# Patient Record
Sex: MALE | Race: WHITE
[De-identification: names, ages, dates, MRNs, and addresses within clinical notes are randomized per-mention and may not be internally consistent; named-entity substitution may affect disease eponyms.]

---

## 2019-02-24 ENCOUNTER — HOSPITAL ENCOUNTER (OUTPATIENT)
Dept: HOSPITAL 47 - EC | Age: 24
Setting detail: OBSERVATION
LOS: 1 days | Discharge: HOME | End: 2019-02-25
Payer: COMMERCIAL

## 2019-02-24 VITALS — RESPIRATION RATE: 18 BRPM

## 2019-02-24 DIAGNOSIS — R55: ICD-10-CM

## 2019-02-24 DIAGNOSIS — E66.9: ICD-10-CM

## 2019-02-24 DIAGNOSIS — R10.9: ICD-10-CM

## 2019-02-24 DIAGNOSIS — R11.2: ICD-10-CM

## 2019-02-24 DIAGNOSIS — R00.0: ICD-10-CM

## 2019-02-24 DIAGNOSIS — R07.89: Primary | ICD-10-CM

## 2019-02-24 DIAGNOSIS — D72.829: ICD-10-CM

## 2019-02-24 DIAGNOSIS — R42: ICD-10-CM

## 2019-02-24 DIAGNOSIS — Z84.89: ICD-10-CM

## 2019-02-24 DIAGNOSIS — R19.7: ICD-10-CM

## 2019-02-24 DIAGNOSIS — F12.90: ICD-10-CM

## 2019-02-24 DIAGNOSIS — J98.11: ICD-10-CM

## 2019-02-24 DIAGNOSIS — R61: ICD-10-CM

## 2019-02-24 LAB
ALBUMIN SERPL-MCNC: 4.3 G/DL (ref 3.5–5)
ALP SERPL-CCNC: 56 U/L (ref 38–126)
ALT SERPL-CCNC: 40 U/L (ref 21–72)
ANION GAP SERPL CALC-SCNC: 10 MMOL/L
APTT BLD: 23.5 SEC (ref 22–30)
AST SERPL-CCNC: 23 U/L (ref 17–59)
BASOPHILS # BLD AUTO: 0.1 K/UL (ref 0–0.2)
BASOPHILS NFR BLD AUTO: 0 %
BUN SERPL-SCNC: 15 MG/DL (ref 9–20)
CALCIUM SPEC-MCNC: 9.4 MG/DL (ref 8.4–10.2)
CHLORIDE SERPL-SCNC: 107 MMOL/L (ref 98–107)
CO2 SERPL-SCNC: 22 MMOL/L (ref 22–30)
D DIMER PPP FEU-MCNC: <0.17 MG/L FEU (ref ?–0.6)
EOSINOPHIL # BLD AUTO: 0.2 K/UL (ref 0–0.7)
EOSINOPHIL NFR BLD AUTO: 1 %
ERYTHROCYTE [DISTWIDTH] IN BLOOD BY AUTOMATED COUNT: 4.73 M/UL (ref 4.3–5.9)
ERYTHROCYTE [DISTWIDTH] IN BLOOD: 12.3 % (ref 11.5–15.5)
GLUCOSE SERPL-MCNC: 115 MG/DL (ref 74–99)
HCT VFR BLD AUTO: 42.6 % (ref 39–53)
HGB BLD-MCNC: 15 GM/DL (ref 13–17.5)
INR PPP: 1 (ref ?–1.2)
LYMPHOCYTES # SPEC AUTO: 1.7 K/UL (ref 1–4.8)
LYMPHOCYTES NFR SPEC AUTO: 9 %
MAGNESIUM SPEC-SCNC: 2 MG/DL (ref 1.6–2.3)
MCH RBC QN AUTO: 31.7 PG (ref 25–35)
MCHC RBC AUTO-ENTMCNC: 35.2 G/DL (ref 31–37)
MCV RBC AUTO: 90 FL (ref 80–100)
MONOCYTES # BLD AUTO: 0.8 K/UL (ref 0–1)
MONOCYTES NFR BLD AUTO: 4 %
NEUTROPHILS # BLD AUTO: 15.2 K/UL (ref 1.3–7.7)
NEUTROPHILS NFR BLD AUTO: 84 %
PH UR: 5.5 [PH] (ref 5–8)
PLATELET # BLD AUTO: 234 K/UL (ref 150–450)
POTASSIUM SERPL-SCNC: 4.2 MMOL/L (ref 3.5–5.1)
PROT SERPL-MCNC: 7 G/DL (ref 6.3–8.2)
PT BLD: 10.3 SEC (ref 9–12)
SODIUM SERPL-SCNC: 139 MMOL/L (ref 137–145)
SP GR UR: 1.01 (ref 1–1.03)
UROBILINOGEN UR QL STRIP: <2 MG/DL (ref ?–2)
WBC # BLD AUTO: 18 K/UL (ref 3.8–10.6)

## 2019-02-24 PROCEDURE — 80053 COMPREHEN METABOLIC PANEL: CPT

## 2019-02-24 PROCEDURE — 81003 URINALYSIS AUTO W/O SCOPE: CPT

## 2019-02-24 PROCEDURE — 83735 ASSAY OF MAGNESIUM: CPT

## 2019-02-24 PROCEDURE — 93017 CV STRESS TEST TRACING ONLY: CPT

## 2019-02-24 PROCEDURE — 99285 EMERGENCY DEPT VISIT HI MDM: CPT

## 2019-02-24 PROCEDURE — 36415 COLL VENOUS BLD VENIPUNCTURE: CPT

## 2019-02-24 PROCEDURE — 85610 PROTHROMBIN TIME: CPT

## 2019-02-24 PROCEDURE — 93306 TTE W/DOPPLER COMPLETE: CPT

## 2019-02-24 PROCEDURE — 85730 THROMBOPLASTIN TIME PARTIAL: CPT

## 2019-02-24 PROCEDURE — 85379 FIBRIN DEGRADATION QUANT: CPT

## 2019-02-24 PROCEDURE — 80306 DRUG TEST PRSMV INSTRMNT: CPT

## 2019-02-24 PROCEDURE — 71275 CT ANGIOGRAPHY CHEST: CPT

## 2019-02-24 PROCEDURE — 85025 COMPLETE CBC W/AUTO DIFF WBC: CPT

## 2019-02-24 PROCEDURE — 71046 X-RAY EXAM CHEST 2 VIEWS: CPT

## 2019-02-24 PROCEDURE — 80061 LIPID PANEL: CPT

## 2019-02-24 PROCEDURE — 84484 ASSAY OF TROPONIN QUANT: CPT

## 2019-02-24 PROCEDURE — 83880 ASSAY OF NATRIURETIC PEPTIDE: CPT

## 2019-02-24 PROCEDURE — 93005 ELECTROCARDIOGRAM TRACING: CPT

## 2019-02-24 NOTE — CT
CT CHEST FOR PULMONARY EMBOLISM.

 

EXAMINATION TYPE: CT chest angio for PE

 

DATE OF EXAM: 2/24/2019

 

INDICATION: Mid chest pain today.

 

CT DLP: 491.5 mGycm, Automated exposure control for dose reduction was used.

 

CONTRAST: Patient injected with 100 mL of Isovue 370.

 

COMPARISON: None

 

TECHNIQUE: CT of the chest is performed on a spiral scan at 2 mm thick sections.  Study is performed 
with intravenous contrast timed for evaluation for pulmonary embolism.  This will limit additional po
rtions of the evaluation.  3-D MIP images reconstructed by the technologist are reviewed on the compu
ter in the coronal and sagittal planes. 

 

FINDINGS:

No persistent filling defects are evident to suggest an acute pulmonary embolism.

 

No mediastinal or hilar adenopathy enlarged by CT criteria is evident.  The ascending aorta diameter 
at the level of the main pulmonary artery is 3.2 cm.  The main pulmonary artery diameter at the bifur
cation is 2.6 cm.

 

There is some streak opacities in the posterior lateral right lung likely atelectasis. This could be 
followed. Some mild infiltrate is within the lingula likely atelectasis.

 

Limited CT section through the upper abdomen are unremarkable.

 

IMPRESSIONS:

1. No acute pulmonary embolism.

2. Some streak opacities within the lingula in the posterior lateral right lung can be related atelec
tasis. This could be followed.

## 2019-02-24 NOTE — XR
EXAMINATION TYPE: XR chest 2V

 

DATE OF EXAM: 2/24/2019

 

COMPARISON: 2/24/2019 outside study

 

INDICATION: Chest pain

 

TECHNIQUE:  Frontal and lateral views of the chest are obtained.

 

FINDINGS:  

The heart size is normal.  

The pulmonary vasculature is normal.

There is some interval development of plate atelectasis at the left base..

 

IMPRESSION:  

1. Developing platelike atelectasis left base

## 2019-02-24 NOTE — ED
General Adult HPI





- General


Chief complaint: Chest Pain


Stated complaint: tachycardia


Time Seen by Provider: 02/24/19 20:13


Source: patient, EMS, RN notes reviewed, old records reviewed


Mode of arrival: EMS


Limitations: no limitations





- History of Present Illness


Initial comments: 


23-year-old male patient with no pertinent past medical history presents to ED 

via transfer from [facility].  Patient reports that he was watching a 

baseball game with his friends, reports that he had drank approximately 3 beers 

and had previously smoked some marijuanna.  Patient was previously on a couch 

and developed some left substernal chest pain/pressure.  Patient states that he 

became very lightheaded and almost syncopized.  Patient was caught by his 

friends, did not suffer any fall or trauma.  Patient states that this chest 

pain continued for about 20 minutes.  Patient was transferred to [facility].  Patient was transferred from [facility] on nitro and heparin 

drip.  Upon arrival to ED patient is patient is asymptomatic.  Patient denies 

all complaints.  Patient has any shortness of breath or chest pain.  Patient 

has a nausea vomiting diarrhea, abdominal pain.  Patient denies other 

complaints.





Systemic: Pt denies fatigue, myalgia, fever/chills, rash. Pt denies weakness, 

night sweats, weight loss. 


Neuro: Pt denies headache, visual disturbances, syncope or pre-syncope.


HEENT: Pt denies ocular discharge or irritation, otalgia, rhinorrhea, 

pharyngitis or notable lymphadenopathy. 


Cardiopulmonary: Pt denies chest pain, SOB, heart palpitations, dyspnea on 

exertion.  


Abdominal/GI: Pt denies abdominal pain, n/v/d. 


: Pt denies dysuria, burning w/ urination, frequency/urgency. Denies new 

onset urinary or bowel incontinence.  


MSK: Pt denies myalgia, loss of strength or function in extremities. 


Neuro: Pt denies new onset weakness, paresthesias. 








- Related Data


 Home Medications











 Medication  Instructions  Recorded  Confirmed


 


No Known Home Medications  02/24/19 02/24/19











 Allergies











Allergy/AdvReac Type Severity Reaction Status Date / Time


 


No Known Allergies Allergy   Verified 02/24/19 23:50














Review of Systems


ROS Statement: 


Those systems with pertinent positive or pertinent negative responses have been 

documented in the HPI.





ROS Other: All systems not noted in ROS Statement are negative.





Past Medical History


Past Medical History: No Reported History


History of Any Multi-Drug Resistant Organisms: None Reported


Past Surgical History: No Surgical Hx Reported


Past Psychological History: No Psychological Hx Reported


Smoking Status: Light tobacco smoker


Past Alcohol Use History: Occasional


Past Drug Use History: Marijuana





General Exam





- General Exam Comments


Initial Comments: 


Constitutional: NAD, AOX3, Pt has pleasant affect. 


HEENT: NC/AT, trachea midline, neck supple, no lymphadenopathy. Posterior 

pharynx non erythematous, without exudates. External ears appear normal, 

without discharge. Mucous membranes moist. Eyes PERRLA, EOM intact. There is no 

scleral icterus. No pallor noted. 


Cardiopulmonary: RRR, no murmurs, rubs or gallops, no JVD noted. Lungs CTAB in 

anterior and posterior fields. No peripheral edema. 


Abdominal exam: Abdomen soft and non-distended. Abdomen non-tender to palpation 

in all 4 quadrants. Bowel sounds active in LLQ. No hepatosplenomegaly. No 

ecchymosis


Neuro: CN II-XII grossly intact. No nuchal rigidity. 


MSK: No posterior calf tenderness bilaterally, homans sign negative 

bilaterally. Posterior tibialis and radial pulse +2 bilaterally. Sensation 

intact in upper and lower extremities. Full active ROM in upper and lower 

extremities, 5/5 stregnth. 





Limitations: no limitations





Course


 Vital Signs











  02/24/19 02/24/19 02/24/19





  20:05 22:32 23:31


 


Temperature 99.2 F  98.8 F


 


Pulse Rate 115 H 91 89


 


Respiratory 16 18 18





Rate   


 


Blood Pressure 135/83 127/74 123/70


 


O2 Sat by Pulse 100 98 99





Oximetry   














Medical Decision Making





- Medical Decision Making


23-year-old male patient with no pertinent past medical history presents to ED 

via transfer from [facility].  Patient reports that he was watching a 

baseball game with his friends, reports that he had drank approximately 3 beers 

and had previously smoked some marijuanna.  Patient was previously on a couch 

and developed some left substernal chest pain/pressure.  Patient states that he 

became very lightheaded and almost syncopized.  Patient was caught by his 

friends, did not suffer any fall or trauma.  Patient states that this chest 

pain continued for about 20 minutes.  Patient was transferred to [facility].  Patient was transferred from [facility] on nitro and heparin 

drip.  Upon arrival to ED patient is patient is asymptomatic.  Patient denies 

all complaints.  Patient has any shortness of breath or chest pain.  Patient 

has a nausea vomiting diarrhea, abdominal pain.  Patient denies other 

complaints. Pt vital signs are as a displayed mild tachycardia which resolved 

with fluid administration.  Laboratory investigations revealed a leukocytosis 

of 18.0.  Correlation studies were within normal limits.  D-dimer was negative.

  CMP was non-impressive.  Troponin was negative.  BNP was within normal 

limits.  UA was negative.  Chest x-ray displayed atelectasis.  CT pulmonary 

angiography revealed no PE, displayed atelectasis. EKG not concerning for acute 

ischemia. Patient to be admitted for serial troponins and further evaluation by 

cardiology.  Case discussed in depth with Dr. Garduno. 








- Lab Data


Result diagrams: 


 02/24/19 20:43





 02/24/19 20:43


 Lab Results











  02/24/19 02/24/19 02/24/19 Range/Units





  20:43 20:43 20:43 


 


WBC  18.0 H    (3.8-10.6)  k/uL


 


RBC  4.73    (4.30-5.90)  m/uL


 


Hgb  15.0    (13.0-17.5)  gm/dL


 


Hct  42.6    (39.0-53.0)  %


 


MCV  90.0    (80.0-100.0)  fL


 


MCH  31.7    (25.0-35.0)  pg


 


MCHC  35.2    (31.0-37.0)  g/dL


 


RDW  12.3    (11.5-15.5)  %


 


Plt Count  234    (150-450)  k/uL


 


Neutrophils %  84    %


 


Lymphocytes %  9    %


 


Monocytes %  4    %


 


Eosinophils %  1    %


 


Basophils %  0    %


 


Neutrophils #  15.2 H    (1.3-7.7)  k/uL


 


Lymphocytes #  1.7    (1.0-4.8)  k/uL


 


Monocytes #  0.8    (0-1.0)  k/uL


 


Eosinophils #  0.2    (0-0.7)  k/uL


 


Basophils #  0.1    (0-0.2)  k/uL


 


PT    10.3  (9.0-12.0)  sec


 


INR    1.0  (<1.2)  


 


APTT    23.5  (22.0-30.0)  sec


 


D-Dimer    <0.17  (<0.60)  mg/L FEU


 


Sodium   139   (137-145)  mmol/L


 


Potassium   4.2   (3.5-5.1)  mmol/L


 


Chloride   107   ()  mmol/L


 


Carbon Dioxide   22   (22-30)  mmol/L


 


Anion Gap   10   mmol/L


 


BUN   15   (9-20)  mg/dL


 


Creatinine   0.86   (0.66-1.25)  mg/dL


 


Est GFR (CKD-EPI)AfAm   >90   (>60 ml/min/1.73 sqM)  


 


Est GFR (CKD-EPI)NonAf   >90   (>60 ml/min/1.73 sqM)  


 


Glucose   115 H   (74-99)  mg/dL


 


Calcium   9.4   (8.4-10.2)  mg/dL


 


Magnesium   2.0   (1.6-2.3)  mg/dL


 


Total Bilirubin   0.5   (0.2-1.3)  mg/dL


 


AST   23   (17-59)  U/L


 


ALT   40   (21-72)  U/L


 


Alkaline Phosphatase   56   ()  U/L


 


Troponin I     (0.000-0.034)  ng/mL


 


NT-Pro-B Natriuret Pep     pg/mL


 


Total Protein   7.0   (6.3-8.2)  g/dL


 


Albumin   4.3   (3.5-5.0)  g/dL














  02/24/19 02/24/19 Range/Units





  20:43 20:43 


 


WBC    (3.8-10.6)  k/uL


 


RBC    (4.30-5.90)  m/uL


 


Hgb    (13.0-17.5)  gm/dL


 


Hct    (39.0-53.0)  %


 


MCV    (80.0-100.0)  fL


 


MCH    (25.0-35.0)  pg


 


MCHC    (31.0-37.0)  g/dL


 


RDW    (11.5-15.5)  %


 


Plt Count    (150-450)  k/uL


 


Neutrophils %    %


 


Lymphocytes %    %


 


Monocytes %    %


 


Eosinophils %    %


 


Basophils %    %


 


Neutrophils #    (1.3-7.7)  k/uL


 


Lymphocytes #    (1.0-4.8)  k/uL


 


Monocytes #    (0-1.0)  k/uL


 


Eosinophils #    (0-0.7)  k/uL


 


Basophils #    (0-0.2)  k/uL


 


PT    (9.0-12.0)  sec


 


INR    (<1.2)  


 


APTT    (22.0-30.0)  sec


 


D-Dimer    (<0.60)  mg/L FEU


 


Sodium    (137-145)  mmol/L


 


Potassium    (3.5-5.1)  mmol/L


 


Chloride    ()  mmol/L


 


Carbon Dioxide    (22-30)  mmol/L


 


Anion Gap    mmol/L


 


BUN    (9-20)  mg/dL


 


Creatinine    (0.66-1.25)  mg/dL


 


Est GFR (CKD-EPI)AfAm    (>60 ml/min/1.73 sqM)  


 


Est GFR (CKD-EPI)NonAf    (>60 ml/min/1.73 sqM)  


 


Glucose    (74-99)  mg/dL


 


Calcium    (8.4-10.2)  mg/dL


 


Magnesium    (1.6-2.3)  mg/dL


 


Total Bilirubin    (0.2-1.3)  mg/dL


 


AST    (17-59)  U/L


 


ALT    (21-72)  U/L


 


Alkaline Phosphatase    ()  U/L


 


Troponin I  <0.012   (0.000-0.034)  ng/mL


 


NT-Pro-B Natriuret Pep   17  pg/mL


 


Total Protein    (6.3-8.2)  g/dL


 


Albumin    (3.5-5.0)  g/dL














- EKG Data


-: EKG Interpreted by Me (and dr garduno)


EKG Comments: 


Ventricular rate 106, NY interval 148, , QT//425.  Sinus 

tachycardia.  No concern for acute ischemia.








Disposition


Clinical Impression: 


 Chest pain, Syncope





Disposition: ADMITTED AS IP TO THIS HOSP


Condition: Fair


Is patient prescribed a controlled substance at d/c from ED?: No

## 2019-02-25 VITALS — HEART RATE: 86 BPM | DIASTOLIC BLOOD PRESSURE: 84 MMHG | TEMPERATURE: 98 F | SYSTOLIC BLOOD PRESSURE: 151 MMHG

## 2019-02-25 LAB
ALBUMIN SERPL-MCNC: 4.3 G/DL (ref 3.5–5)
ALP SERPL-CCNC: 47 U/L (ref 38–126)
ALT SERPL-CCNC: 38 U/L (ref 21–72)
ANION GAP SERPL CALC-SCNC: 9 MMOL/L
AST SERPL-CCNC: 24 U/L (ref 17–59)
BASOPHILS # BLD AUTO: 0.1 K/UL (ref 0–0.2)
BASOPHILS NFR BLD AUTO: 1 %
BUN SERPL-SCNC: 11 MG/DL (ref 9–20)
CALCIUM SPEC-MCNC: 9.8 MG/DL (ref 8.4–10.2)
CHLORIDE SERPL-SCNC: 110 MMOL/L (ref 98–107)
CHOLEST SERPL-MCNC: 190 MG/DL (ref ?–200)
CO2 SERPL-SCNC: 23 MMOL/L (ref 22–30)
EOSINOPHIL # BLD AUTO: 0.2 K/UL (ref 0–0.7)
EOSINOPHIL NFR BLD AUTO: 2 %
ERYTHROCYTE [DISTWIDTH] IN BLOOD BY AUTOMATED COUNT: 4.49 M/UL (ref 4.3–5.9)
ERYTHROCYTE [DISTWIDTH] IN BLOOD: 12.4 % (ref 11.5–15.5)
GLUCOSE SERPL-MCNC: 104 MG/DL (ref 74–99)
HCT VFR BLD AUTO: 41.6 % (ref 39–53)
HDLC SERPL-MCNC: 38 MG/DL (ref 40–60)
HGB BLD-MCNC: 14 GM/DL (ref 13–17.5)
LDLC SERPL CALC-MCNC: 118 MG/DL (ref 0–99)
LYMPHOCYTES # SPEC AUTO: 3.1 K/UL (ref 1–4.8)
LYMPHOCYTES NFR SPEC AUTO: 23 %
MCH RBC QN AUTO: 31.1 PG (ref 25–35)
MCHC RBC AUTO-ENTMCNC: 33.6 G/DL (ref 31–37)
MCV RBC AUTO: 92.6 FL (ref 80–100)
MONOCYTES # BLD AUTO: 0.7 K/UL (ref 0–1)
MONOCYTES NFR BLD AUTO: 5 %
NEUTROPHILS # BLD AUTO: 9.1 K/UL (ref 1.3–7.7)
NEUTROPHILS NFR BLD AUTO: 68 %
PLATELET # BLD AUTO: 221 K/UL (ref 150–450)
POTASSIUM SERPL-SCNC: 4.5 MMOL/L (ref 3.5–5.1)
PROT SERPL-MCNC: 7.1 G/DL (ref 6.3–8.2)
SODIUM SERPL-SCNC: 142 MMOL/L (ref 137–145)
TRIGL SERPL-MCNC: 170 MG/DL (ref ?–150)
WBC # BLD AUTO: 13.4 K/UL (ref 3.8–10.6)

## 2019-02-25 NOTE — P.DS
Providers


Date of admission: 


02/24/19 22:24





Expected date of discharge: 02/25/19


Attending physician: 


Tammi Roberts





Consults: 





 





02/24/19 22:53


Consult Physician Urgent 


   Consulting Provider: Otis Davis


   Consult Reason/Comments: chest pain, syncope


   Do you want consulting provider notified?: Yes











Primary care physician: 


Naomi [facility] Course: 





discharge diagnosis





1.  Chest pain.  Troponins negative 3.  EKG completed showing sinus 

tachycardia.  CTA completed showing no acute pulmonary embolism.  Some streaky 

opacities within the lingula in the posterior-lateral right leg have be related 

atelectasis.  This can be followed.  Cardiology consult has been placed.  2-D 

echocardiogram completed showing an EF greater than 55%. stress test completed 

showing excellent exercise tolerance normal EKG response to exercise.  

Discussed with cardiology services.  Patient has been cleared for discharge 

from cardiology standpoint acute coronary event has been ruled out. patient has 

been cleared for discharge from cardiology standpoint





2.  Leukocytosis.  Initial white blood cell 18.0.  UA negative.  Patient denies 

any symptoms of acute infection.  Patient afebrile.  Repeat WBC 13.4.  chest x-

ray has been completed showing developing platelike atelectasis left base.  

Patient denies any symptoms at this time.  Patient to follow-up PCP preferred 

management





3.  Occasional marijuana use.








Hospital course





This is a 23-year-old male patient of Dr. levine.  Patient presented to the 

emergency room with complaints of chest pain and near syncopal episode.  

Patient reports he was watching TV with a group of friends.  Patient does 

report he was drinking beer and had smoked some marijuana.  Patient states that 

while he was sitting down he started to experiencing chest discomfort that last 

approximately 10 minutes that caused diaphoresis and radiation to arm and neck.

  Patient reports that he felt lightheaded but never actually lost 

consciousness.  Patient was transferred to [facility] there to [facility] for further evaluation.  Patient denies any significant medical 

history.  Patient denies any family history of cardiac events.  Patient states 

he does occasionally smoke marijuana but denies any regular nicotine use.  

Chest x-ray completed showing developing platelike atelectasis left base.  

Chest CTA completed showing no acute pulmonary embolism and some streaky 

opacities within the lingula in the posterior-lateral right lung can be related 

atelectasis this could be followed.  At this time patient denies any chest pain 

or shortness breath.  Patient denies any nausea vomiting or diarrhea.  Patient 

denies any urinary burning or frequency. 








2-D echocardiogram completed showing an EF greater than 55%. stress test 

completed showing excellent exercise tolerance normal EKG response to exercise.

  Discussed with cardiology services.  Patient has been cleared for discharge 

from cardiology standpoint acute coronary event has been ruled out





patient's white blood cell trend down to 13.4.  Patient follow-up PCP for 

further management.





I performed an examination of the patient and discussed their management with 

the Nurse Practitioner.  I have reviewed the Nurse Practitioner's notes and 

agree with the documented findings and plan of care


Patient Condition at Discharge: Stable





Plan - Discharge Summary


Discharge Rx Participant: No


New Discharge Prescriptions: 


Continue


   No Known Home Medications 


Discharge Medication List





No Known Home Medications  02/24/19 [History]








Follow up Appointment(s)/Referral(s): 


Naomi Camejo MD [Primary Care Provider] - 1-2 days


Activity/Diet/Wound Care/Special Instructions: 


Activity as tolerated





diet regular





Discharge Disposition: HOME SELF-CARE

## 2019-02-25 NOTE — ECHOF
Referral Reason:cp



MEASUREMENTS

--------

HEIGHT: 175.3 cm

WEIGHT: 102.1 kg

BP: 

RVIDd:   2.9 cm     (< 3.3)

IVSd:   1.0 cm     (0.6 - 1.1)

LVIDd:   4.7 cm     (3.9 - 5.3)

LVPWd:   1.4 cm     (0.6 - 1.1)

IVSs:   1.4 cm

LVIDs:   3.7 cm

LVPWs:   1.3 cm

LA Diam:   3.3 cm     (2.7 - 3.8)

Ao Diam:   3.2 cm     (2.0 - 3.7)

AV Cusp:   1.5 cm     (1.5 - 2.6)

LA Diam:   3.2 cm     (2.7 - 3.8)

MV EXCURSION:   24.295 mm     (> 18.000)

MV EF SLOPE:   155 mm/s     (70 - 150)

EPSS:   0.5 cm

MV E Alonso:   0.77 m/s

MV DecT:   283 ms

MV A Alonso:   0.76 m/s

MV E/A Ratio:   1.02 

RAP:   5.00 mmHg

RVSP:   15.95 mmHg







FINDINGS

--------

Sinus rhythm.

This was a technically good study.

LV size, wall thickness and systolic function are normal, with an EF greater than 55%.   The left aleida
tricular size is normal.

The right ventricle is normal in size.

The left atrial size is normal.

The right atrial size is normal.

The aortic valve is trileaflet, and appears structurally normal. No aortic stenosis or regurgitation.


There is trace mitral regurgitation.

Mild tricuspid regurgitation present.   There is no evidence of pulmonary hypertension.   The right v
entricular systolic pressure, as measured by Doppler, is 15.95mmHg.

There is no pulmonic regurgitation present.

The aortic root size is normal.

There is no pericardial effusion.



CONCLUSIONS

--------

1. LV size, wall thickness and systolic function are normal, with an EF greater than 55%.

2. The left ventricular size is normal.

3. The right ventricle is normal in size.

4. The left atrial size is normal.

5. The right atrial size is normal.

6. The aortic valve is trileaflet, and appears structurally normal. No aortic stenosis or regurgitati
on.

7. There is trace mitral regurgitation.

8. Mild tricuspid regurgitation present.

9. There is no evidence of pulmonary hypertension.

10. The right ventricular systolic pressure, as measured by Doppler, is 15.95mmHg.

11. There is no pulmonic regurgitation present.

12. The aortic root size is normal.

13. There is no pericardial effusion.





SONOGRAPHER: Vanesa Zaragoza RDCS

## 2019-02-25 NOTE — P.HPIM
History of Present Illness


H&P Date: 02/25/19





This is a 23-year-old male patient of Dr. levine.  Patient presented to the 

emergency room with complaints of chest pain and near syncopal episode.  

Patient reports he was watching TV with a group of friends.  Patient does 

report he was drinking beer and had smoked some marijuana.  Patient states that 

while he was sitting down he started to experiencing chest discomfort that last 

approximately 10 minutes that caused diaphoresis and radiation to arm and neck.

  Patient reports that he felt lightheaded but never actually lost 

consciousness.  Patient was transferred to [facility] there to [facility] for further evaluation.  Patient denies any significant medical 

history.  Patient denies any family history of cardiac events.  Patient states 

he does occasionally smoke marijuana but denies any regular nicotine use.  

Chest x-ray completed showing developing platelike atelectasis left base.  

Chest CTA completed showing no acute pulmonary embolism and some streaky 

opacities within the lingula in the posterior-lateral right lung can be related 

atelectasis this could be followed.  At this time patient denies any chest pain 

or shortness breath.  Patient denies any nausea vomiting or diarrhea.  Patient 

denies any urinary burning or frequency. 





Review of Systems





Please refer to HPI otherwise unremarkable





Past Medical History


Past Medical History: No Reported History


History of Any Multi-Drug Resistant Organisms: None Reported


Past Surgical History: No Surgical Hx Reported


Past Anesthesia/Blood Transfusion Reactions: No Reported Reaction


Smoking Status: Light tobacco smoker





- Past Family History


  ** Mother


Family Medical History: No Reported History





  ** Father


Family Medical History: No Reported History





  ** Brother(s)


Family Medical History: Syncope





  ** Sister(s)


Family Medical History: No Reported History





Medications and Allergies


 Home Medications











 Medication  Instructions  Recorded  Confirmed  Type


 


No Known Home Medications  02/24/19 02/24/19 History











 Allergies











Allergy/AdvReac Type Severity Reaction Status Date / Time


 


No Known Allergies Allergy   Verified 02/24/19 23:50














Physical Exam


Vitals: 


 Vital Signs











  Temp Pulse Pulse Resp BP BP BP


 


 02/25/19 08:46        132/82


 


 02/25/19 08:00     18   


 


 02/25/19 07:58  97.6 F   71  18   133/77 


 


 02/25/19 03:42  97.6 F   62  18   132/71 


 


 02/25/19 03:17     18   


 


 02/25/19 00:45  98.2 F   72  18   132/74 


 


 02/25/19 00:00     18   


 


 02/24/19 23:31  98.8 F  89   18  123/70  


 


 02/24/19 22:32   91   18  127/74  


 


 02/24/19 20:05  99.2 F  115 H   16  135/83  














  BP BP Pulse Ox


 


 02/25/19 08:46  137/84  148/78 


 


 02/25/19 08:00   


 


 02/25/19 07:58    97


 


 02/25/19 03:42    95


 


 02/25/19 03:17   


 


 02/25/19 00:45    95


 


 02/25/19 00:00   


 


 02/24/19 23:31    99


 


 02/24/19 22:32    98


 


 02/24/19 20:05    100








 Intake and Output











 02/24/19 02/25/19 02/25/19





 22:59 06:59 14:59


 


Other:   


 


  Voiding Method  Toilet Toilet


 


  # Voids  2 


 


  Weight 102.058 kg  














Head normocephalic


Neck supple


Lungs clear to auscultation bilaterally no wheezing or crackles


Heart regular rate and rhythm S1-S2, no rub or gallop


Abdomen is soft nontender nondistended positive bowel sounds no 

hepatosplenomegaly


Extremities no edema


Neuro alert and orientated to 3





Results


CBC & Chem 7: 


 02/25/19 08:06





 02/25/19 08:06


Labs: 


 Abnormal Lab Results - Last 24 Hours (Table)











  02/24/19 02/24/19 02/24/19 Range/Units





  20:43 20:43 22:34 


 


WBC  18.0 H    (3.8-10.6)  k/uL


 


Neutrophils #  15.2 H    (1.3-7.7)  k/uL


 


Chloride     ()  mmol/L


 


Glucose   115 H   (74-99)  mg/dL


 


Triglycerides     (<150)  mg/dL


 


LDL Cholesterol, Calc     (0-99)  mg/dL


 


HDL Cholesterol     (40-60)  mg/dL


 


U Marijuana (THC) Screen    Detected H  (NotDetected)  














  02/25/19 02/25/19 02/25/19 Range/Units





  02:57 08:06 08:06 


 


WBC    13.4 H  (3.8-10.6)  k/uL


 


Neutrophils #    9.1 H  (1.3-7.7)  k/uL


 


Chloride   110 H   ()  mmol/L


 


Glucose   104 H   (74-99)  mg/dL


 


Triglycerides  170 H    (<150)  mg/dL


 


LDL Cholesterol, Calc  118 H    (0-99)  mg/dL


 


HDL Cholesterol  38 L    (40-60)  mg/dL


 


U Marijuana (THC) Screen     (NotDetected)  














Thrombosis Risk Factor Assmnt





- Choose All That Apply


Any of the Below Risk Factors Present?: Yes


Each Factor Represents 1 point: Obesity (BMI >25)


Other Risk Factors: No


Other congenital or acquired thrombophilia - If yes, enter type in comment: No


Thrombosis Risk Factor Assessment Total Risk Factor Score: 1


Thrombosis Risk Factor Assessment Level: Low Risk





Assessment and Plan


Assessment: 





1.  Chest pain.  Troponins negative 3.  EKG completed showing sinus 

tachycardia.  CTA completed showing no acute pulmonary embolism.  Some streaky 

opacities within the lingula in the posterior-lateral right leg have be related 

atelectasis.  This can be followed.  Cardiology consult has been placed.  2-D 

echo has been ordered.  Stress test ordered





2.  Leukocytosis.  Initial white blood cell 18.0.  UA negative.  Patient denies 

any symptoms of acute infection.  Patient afebrile.  Repeat WBC 13.4





3.  Occasional marijuana use.











Time with Patient: Greater than 30 (Greater than 60% of the total time spent in 

counseling and coordination of care.  I performed an examination of the patient 

and discussed their management with the Nurse Practitioner.  I have reviewed 

the Nurse Practitioner's notes and agree with the documented findings and plan 

of care)

## 2019-02-25 NOTE — EST
EXERCISE STRESS



DATE OF SERVICE:

02/25/2019



AGE:

23



SEX:

Male



HT:

69"



WT:

225



PROTOCOL:

Exercise treadmill stress test



STAGE:

II



DURATION OF EXERCISE:

8 minutes



HEART RATE REST:

83



BLOOD PRESSURE REST:

147/86



MAXIMUM HEART RATE ACHIEVED:

178



MAXIMUM BLOOD PRESSURE:

197/67



85% MPHR:

168



100% MPHR:

198



METS:

10.3



INDICATIONS:

Chest pain.



CLINICAL INFORMATION:

STRESS DATA:  Pretesting physical examination showed a heart rate of 83, pressure is

147/86 mmHg.  Baseline EKG showed sinus mechanism.  The patient exercised on the

treadmill according to Arnoldo protocol for a total of 8 minutes and achieved 10.3 METs.

Max heart rate was 178, which is about 89% of maximum predicted heart rate.  Maximum

blood pressure was 197/67 mmHg. Clinically the patient did not have any symptoms of

chest pain or chest discomfort during the testing or on recovery and the EKG did not

show any significant ST or T-wave abnormalities concerning for ischemia.



CONCLUSION:

1. Excellent exercise tolerance.

2. Normal EKG in response to exercise.





MMODL / IJN: [id number] / Job#: [id number]

## 2019-02-25 NOTE — P.CRDCN
History of Present Illness


Consult date: 02/25/19


Chief complaint: Chest pain


History of present illness: 





This is a pleasant 23-year-old gentleman with no significant past medical 

history who was brought from [facility] to [facility] for 

further evaluation off chest discomfort and syncope.





The patient was in his usual state of health when he was sitting watching TV 

with a group of her friend and he was drinking beer and smoking marijuana as 

well.  While he was sitting watching TV, he started experiencing chest 

discomfort, in the mid of the chest, as a sharp kind of discomfort, without any 

radiation to the arm or neck or shoulders and without any shortness of breath, 

dizziness, or heart racing or fluttering.  Subsequently he lost his 

consciousness according to him for few seconds only.  That was witnessed by his 

friend.  The patient was transferred to [facility] for further 

evaluation and management.





The EKG showed sinus rhythm.  The cardiac enzymes were checked and came in to 

be unremarkable.  The chest x-ray showed no acute abnormalities.  The computed 

tomography scan of the chest showed no PE.





The patient does not have any significant past medical history or past surgical 

history.  He does not smoke cigarettes on a regular basis but he smoked 

marijuana sometimes.  No significant family history of coronary artery disease.





Past Medical History


Past Medical History: No Reported History


History of Any Multi-Drug Resistant Organisms: None Reported


Past Surgical History: No Surgical Hx Reported


Past Anesthesia/Blood Transfusion Reactions: No Reported Reaction


Smoking Status: Light tobacco smoker





- Past Family History


  ** Mother


Family Medical History: No Reported History





  ** Father


Family Medical History: No Reported History





  ** Brother(s)


Family Medical History: Syncope





  ** Sister(s)


Family Medical History: No Reported History





Medications and Allergies


 Home Medications











 Medication  Instructions  Recorded  Confirmed  Type


 


No Known Home Medications  02/24/19 02/24/19 History











 Allergies











Allergy/AdvReac Type Severity Reaction Status Date / Time


 


No Known Allergies Allergy   Verified 02/24/19 23:50














Physical Exam


Vitals: 


 Vital Signs











  Temp Pulse Pulse Resp BP BP Pulse Ox


 


 02/25/19 07:58  97.6 F   71  18   133/77  97


 


 02/25/19 03:42  97.6 F   62  18   132/71  95


 


 02/25/19 03:17     18   


 


 02/25/19 00:45  98.2 F   72  18   132/74  95


 


 02/25/19 00:00     18   


 


 02/24/19 23:31  98.8 F  89   18  123/70   99


 


 02/24/19 22:32   91   18  127/74   98


 


 02/24/19 20:05  99.2 F  115 H   16  135/83   100








 Intake and Output











 02/24/19 02/25/19 02/25/19





 22:59 06:59 14:59


 


Other:   


 


  Voiding Method  Toilet 


 


  # Voids  2 


 


  Weight 102.058 kg  














- Constitutional


General appearance: no acute distress





- Respiratory


Respiratory: bilateral: CTA





- Cardiovascular


Rhythm: regular


Heart sounds: normal: S1, S2





Results





 02/25/19 08:06





 02/24/19 20:43


 Cardiac Enzymes











  02/24/19 02/24/19 02/25/19 Range/Units





  20:43 20:43 02:57 


 


AST  23    (17-59)  U/L


 


Troponin I   <0.012  <0.012  (0.000-0.034)  ng/mL








 Coagulation











  02/24/19 Range/Units





  20:43 


 


PT  10.3  (9.0-12.0)  sec


 


APTT  23.5  (22.0-30.0)  sec








 Lipids











  02/25/19 Range/Units





  02:57 


 


Triglycerides  170 H  (<150)  mg/dL


 


Cholesterol  190  (<200)  mg/dL


 


HDL Cholesterol  38 L  (40-60)  mg/dL








 CBC











  02/24/19 02/25/19 Range/Units





  20:43 08:06 


 


WBC  18.0 H  13.4 H  (3.8-10.6)  k/uL


 


RBC  4.73  4.49  (4.30-5.90)  m/uL


 


Hgb  15.0  14.0  (13.0-17.5)  gm/dL


 


Hct  42.6  41.6  (39.0-53.0)  %


 


Plt Count  234  221  (150-450)  k/uL








 Comprehensive Metabolic Panel











  02/24/19 Range/Units





  20:43 


 


Sodium  139  (137-145)  mmol/L


 


Potassium  4.2  (3.5-5.1)  mmol/L


 


Chloride  107  ()  mmol/L


 


Carbon Dioxide  22  (22-30)  mmol/L


 


BUN  15  (9-20)  mg/dL


 


Creatinine  0.86  (0.66-1.25)  mg/dL


 


Glucose  115 H  (74-99)  mg/dL


 


Calcium  9.4  (8.4-10.2)  mg/dL


 


AST  23  (17-59)  U/L


 


ALT  40  (21-72)  U/L


 


Alkaline Phosphatase  56  ()  U/L


 


Total Protein  7.0  (6.3-8.2)  g/dL


 


Albumin  4.3  (3.5-5.0)  g/dL








 Current Medications











Generic Name Dose Route Start Last Admin





  Trade Name Freq  PRN Reason Stop Dose Admin


 


Aspirin  325 mg  02/25/19 09:00  





  Aspirin  PO   





  DAILY BEE   





     





     





     





     


 


Nitroglycerin  0.4 mg  02/24/19 22:53  





  Nitrostat  SUBLINGUAL   





  Q5M PRN   





  Chest Pain   





     





     





     








 Intake and Output











 02/24/19 02/25/19 02/25/19





 22:59 06:59 14:59


 


Other:   


 


  Voiding Method  Toilet 


 


  # Voids  2 


 


  Weight 102.058 kg  








 





 02/25/19 08:06 





 02/24/19 20:43 











Assessment and Plan


Assessment: 





Assessment


#1 atypical chest discomfort


#2 an episode of syncope





Plan


#1 acute coronary event was ruled out


#2 PE was ruled out


#3 rule out orthostatic hypotension.  I will check orthostatic blood pressure


#4 obtain an echocardiogram was Doppler


#5 possible stress test to be done.





Thank you for allowing us participate in his care and we will continue 

following up with the patient